# Patient Record
Sex: FEMALE | Race: WHITE | NOT HISPANIC OR LATINO | Employment: FULL TIME | URBAN - NONMETROPOLITAN AREA
[De-identification: names, ages, dates, MRNs, and addresses within clinical notes are randomized per-mention and may not be internally consistent; named-entity substitution may affect disease eponyms.]

---

## 2024-05-03 ENCOUNTER — LAB (OUTPATIENT)
Dept: LAB | Facility: CLINIC | Age: 59
End: 2024-05-03
Payer: COMMERCIAL

## 2024-05-03 ENCOUNTER — OFFICE VISIT (OUTPATIENT)
Dept: HEMATOLOGY/ONCOLOGY | Facility: CLINIC | Age: 59
End: 2024-05-03
Payer: COMMERCIAL

## 2024-05-03 VITALS
HEIGHT: 65 IN | WEIGHT: 129.85 LBS | DIASTOLIC BLOOD PRESSURE: 83 MMHG | OXYGEN SATURATION: 97 % | SYSTOLIC BLOOD PRESSURE: 129 MMHG | TEMPERATURE: 97.5 F | HEART RATE: 80 BPM | RESPIRATION RATE: 16 BRPM | BODY MASS INDEX: 21.63 KG/M2

## 2024-05-03 DIAGNOSIS — D68.51 FACTOR V LEIDEN (MULTI): Primary | ICD-10-CM

## 2024-05-03 DIAGNOSIS — O03.9 MISCARRIAGE (HHS-HCC): ICD-10-CM

## 2024-05-03 DIAGNOSIS — E72.12 MTHFR (METHYLENE THF REDUCTASE) DEFICIENCY AND HOMOCYSTINURIA (MULTI): ICD-10-CM

## 2024-05-03 DIAGNOSIS — E72.11 MTHFR (METHYLENE THF REDUCTASE) DEFICIENCY AND HOMOCYSTINURIA (MULTI): ICD-10-CM

## 2024-05-03 PROBLEM — L03.213 PRESEPTAL CELLULITIS OF RIGHT UPPER EYELID: Status: ACTIVE | Noted: 2020-03-16

## 2024-05-03 PROBLEM — S81.812A LACERATION OF LEFT LOWER EXTREMITY: Status: ACTIVE | Noted: 2021-10-12

## 2024-05-03 PROBLEM — F32.A DEPRESSION: Status: ACTIVE | Noted: 2024-05-03

## 2024-05-03 PROBLEM — L08.9 INFECTED SEBACEOUS CYST: Status: ACTIVE | Noted: 2024-05-03

## 2024-05-03 PROBLEM — M54.50 LOWER BACK PAIN: Status: ACTIVE | Noted: 2024-05-03

## 2024-05-03 PROBLEM — F41.9 ANXIETY AND DEPRESSION: Status: ACTIVE | Noted: 2024-05-03

## 2024-05-03 PROBLEM — S09.90XA CLOSED HEAD INJURY: Status: ACTIVE | Noted: 2021-10-12

## 2024-05-03 PROBLEM — F10.21 RECOVERING ALCOHOLIC IN REMISSION (MULTI): Status: ACTIVE | Noted: 2024-05-03

## 2024-05-03 PROBLEM — F32.A ANXIETY AND DEPRESSION: Status: ACTIVE | Noted: 2024-05-03

## 2024-05-03 PROBLEM — G47.00 INSOMNIA: Status: ACTIVE | Noted: 2024-05-03

## 2024-05-03 PROBLEM — L72.3 INFECTED SEBACEOUS CYST: Status: ACTIVE | Noted: 2024-05-03

## 2024-05-03 PROBLEM — J11.1 INFLUENZA: Status: ACTIVE | Noted: 2024-05-03

## 2024-05-03 PROBLEM — J20.9 ACUTE BRONCHITIS: Status: ACTIVE | Noted: 2024-05-03

## 2024-05-03 PROBLEM — E78.5 HYPERLIPEMIA: Status: ACTIVE | Noted: 2024-05-03

## 2024-05-03 PROCEDURE — G0452 MOLECULAR PATHOLOGY INTERPR: HCPCS | Performed by: INTERNAL MEDICINE

## 2024-05-03 PROCEDURE — 86146 BETA-2 GLYCOPROTEIN ANTIBODY: CPT | Performed by: INTERNAL MEDICINE

## 2024-05-03 PROCEDURE — 81241 F5 GENE: CPT | Performed by: INTERNAL MEDICINE

## 2024-05-03 PROCEDURE — 99205 OFFICE O/P NEW HI 60 MIN: CPT | Performed by: INTERNAL MEDICINE

## 2024-05-03 PROCEDURE — 36415 COLL VENOUS BLD VENIPUNCTURE: CPT | Performed by: INTERNAL MEDICINE

## 2024-05-03 PROCEDURE — 1036F TOBACCO NON-USER: CPT | Performed by: INTERNAL MEDICINE

## 2024-05-03 PROCEDURE — 86147 CARDIOLIPIN ANTIBODY EA IG: CPT | Performed by: INTERNAL MEDICINE

## 2024-05-03 PROCEDURE — 99215 OFFICE O/P EST HI 40 MIN: CPT | Performed by: INTERNAL MEDICINE

## 2024-05-03 RX ORDER — AMITRIPTYLINE HYDROCHLORIDE 25 MG/1
2 TABLET, FILM COATED ORAL NIGHTLY
COMMUNITY
Start: 2023-02-06

## 2024-05-03 RX ORDER — TRIAMCINOLONE ACETONIDE 1 MG/G
CREAM TOPICAL
COMMUNITY
Start: 2024-03-19

## 2024-05-03 RX ORDER — FLUCONAZOLE 150 MG/1
150 TABLET ORAL
COMMUNITY
Start: 2023-06-17

## 2024-05-03 RX ORDER — ACETAMINOPHEN 500 MG
500 TABLET ORAL EVERY 8 HOURS PRN
COMMUNITY

## 2024-05-03 RX ORDER — NYSTATIN 100000 [USP'U]/G
POWDER TOPICAL
COMMUNITY
Start: 2023-06-06

## 2024-05-03 RX ORDER — HYDROCORTISONE 25 MG/G
CREAM TOPICAL
COMMUNITY
Start: 2024-03-19

## 2024-05-03 RX ORDER — PROPRANOLOL HYDROCHLORIDE 20 MG/1
1 TABLET ORAL 2 TIMES DAILY
COMMUNITY
Start: 2023-03-07

## 2024-05-03 ASSESSMENT — PAIN SCALES - GENERAL: PAINLEVEL: 0-NO PAIN

## 2024-05-03 NOTE — PROGRESS NOTES
AVS provided with providers instructions, medication instructions and surgical clearance.  Labs today as ordered.  Surgery in early June with Dr De Jesus.  FUV in Mid July.  Call back instructions reviewed.  Patient verbalized understanding.   Eliquis coupon provided.

## 2024-05-03 NOTE — PATIENT INSTRUCTIONS
ORDERS & PATIENT INSTRUCTIONS:     Circulating lupus anticoagulant, anticardiolipin antibody, antibeta-2 glycoprotein 1 antibody, factor V Leiden mutation.  Homocystine level.        Patient is cleared for her hip surgery  Patient to take Eliquis 2.5 mg p.o. twice daily for 5 weeks after surgery, (patient can start once cleared from surgical standpoint).     Follow-up with me in 4 to 5 weeks after surgery so if she is not back to her baseline, we could potentially extend Eliquis.

## 2024-05-03 NOTE — PROGRESS NOTES
Patient ID: Velma Sierra is a 59 y.o. female.  Referring Physician: No referring provider defined for this encounter.  Primary Care Provider: Robyn Pineda MD      ORDERS & PATIENT INSTRUCTIONS:    Circulating lupus anticoagulant, anticardiolipin antibody, antibeta-2 glycoprotein 1 antibody, factor V Leiden mutation.  Homocystine level.      Patient is cleared for her hip surgery  Patient to take Eliquis 2.5 mg p.o. twice daily for 5 weeks after surgery, (patient can start once cleared from surgical standpoint).    Follow-up with me in 4 to 5 weeks after surgery so if she is not back to her baseline, we could potentially extend Eliquis.        CONSULTING PHYSICIAN:  Dr. Meza    REASON FOR CONSULTATION:    History of miscarriages, factor V Leiden heterozygous mutation MTHFR heterozygous mutation, anticardiolipin antibody positivity in the past    HISTORY OF PRESENT ILLNESS:  Patient is a 59-year-old white woman with past medical history of 3 miscarriages after first healthy baby and further workup at that time revealed that she was heterozygous factor V Leiden mutation positive, anticardiolipin antibody positive and MTHFR mutation positive,.  Patient had subsequent pregnancies during which she received aspirin and Lovenox prophylactically and has had healthy baby's without any additional problem, did not have any DVT or PE.  Who is now planning to go for hip replacement on 7 June 2024 so I been asked to further evaluate for recommending DVT prophylaxis.        PAST MEDICAL HISTORY:  3 miscarriages, factor V Leiden mutation heterozygous, MTHFR mutation heterozygous, anticardiolipin antibody as per HPI, arthritis, hearing problem, eczema, anxiety  No past medical history on file.     Past Surgical History:   Procedure Laterality Date    CERVICAL FUSION  02/26/2014    Cervical Vertebral Fusion            CURRENT MEDICATION:  Current Outpatient Medications   Medication Sig Dispense Refill    acetaminophen  "(Tylenol) 500 mg tablet Take 1 tablet (500 mg) by mouth every 8 hours if needed.      amitriptyline (Elavil) 25 mg tablet Take 2 tablets (50 mg) by mouth once daily at bedtime.      fluconazole (Diflucan) 150 mg tablet Take 1 tablet (150 mg) by mouth 1 (one) time per week.      hydrocortisone 2.5 % cream Apply to active rash on face twice a day when clear stop, then use prn      IBUPROFEN ORAL Take by mouth.      Nyamyc 100,000 unit/gram powder apply to affected area twice a day for 14 days      propranolol (Inderal) 20 mg tablet Take 1 tablet (20 mg) by mouth 2 times a day.      triamcinolone (Kenalog) 0.1 % cream Apply topically twice daily to affected area to active rash Only use Triamcinolone on active rash. Moisturize 1-2 times daily with CeraVe cream. Not to face       No current facility-administered medications for this visit.                 REVIEW OF SYSTEMS:    Arthritic pain but denies any bleeding from any orifice denies any headache fever cough chest pain shortness of breath urinary symptoms rest of the 10 review of system is negative as per HPI    PHYSICAL EXAM:  Objective   BSA: 1.64 meters squared,   /83   Pulse 80   Temp 36.4 °C (97.5 °F) (Temporal)   Resp 16   Ht (S) 1.652 m (5' 5.04\")   Wt 58.9 kg (129 lb 13.6 oz)   SpO2 97%   BMI 21.58 kg/m²   Gen:  Conscious,  no acute distress,   HEENT: Normocephalic, no icterus. . No nasal discharge,  Oral mucosa moist  Chest: Bilateral symmetrical, Bilateral AE        CVS: S1S2.   Abdomen: Soft, no guarding or rigidity BS+   Extremities: No C/C   Skin: No petechiae          LABS:    CBC:No results for input(s): \"WBC\", \"ANC\", \"HGB\", \"HCT\", \"PLT\", \"MCV\" in the last 98276 hours.    No lab exists for component: \"DIF\"    CMP:No results for input(s): \"NA\", \"K\", \"CL\", \"CO2\", \"ANIONGAP\", \"BUN\", \"CREATININE\", \"EGFR\", \"MG\" in the last 91932 hours.No results for input(s): \"ALBUMIN\", \"ALKPHOS\", \"ALT\", \"AST\", \"BILITOT\", \"LIPASE\" in the last 29303 " "hours.    No lab exists for component: \"CA\"    HEME/ENDO:No results for input(s): \"FERRITIN\", \"IRONSAT\", \"TSH\", \"MCIURPFM68\", \"FOLATE\" in the last 57456 hours.     MICRO: No results for input(s): \"ESR\", \"CRP\", \"PROCAL\" in the last 87184 hours.  No results found for the last 90 days.        TUMOR MARKERS:  No results found for: \"LABCA2\", \"CEA\", \"\", \"PSA\", \"AFPTM\", \"HCGTM\", \"\"             IMAGING:         XR LUMBAR SPINE COMPLETE 4+ VIEWS  EXAMINATION: LUMBAR SPINE      HISTORY:   Pain        FINDINGS:   Four views of the lumbar spine including lateral flexion and   extension views. Normal bone mineralization. The vertebral body   heights are maintained. There small anterior and lateral osteophytes   at several levels. No significant spondylolisthesis or change in   alignment with flexion or extension. Mild facet arthropathy mainly at   the lower lumbar spine. The pedicles are preserved.        IMPRESSION:     Mild multilevel degenerative changes. No visible acute process.                SOCIAL HISTORY:    reports no history of alcohol use.   reports no history of drug use.,   Tobacco Use: Low Risk  (5/3/2024)    Patient History     Smoking Tobacco Use: Never     Smokeless Tobacco Use: Never     Passive Exposure: Never       FAMILY HISTORY:  No family history on file.  Node DVT or cancer in the family      ALLERGY:   Shellfish containing products          ASSESSMENT & PLAN:  Patient is a 59-year-old white woman with history of 3 miscarriages and further workup revealed that she was heterozygous for factor V Leiden mutation, heterozygous for MTHFR mutation and positive for anticardiolipin antibody, she had additional successful pregnancy afterward when she was given prophylactic aspirin and Lovenox,.  I had long discussion with patient and  and significance of factor V Leiden mutation were discussed, I also explained that heterozygous factor V Leiden mutation is present in approximately 5% of " Caucasians and carries 3-5 times increased risk of venous thromboembolism compared to general population.  At the same time I do not believe that was the reason of her miscarriages and probably most likely reason for miscarriage could very well be anticardiolipin antibody.  At this time I will recheck anticardiolipin antibody and antibeta-2 glycoprotein 1 antibody circulating lupus anticoagulant to rule out antiphospholipid antibody syndrome as she had MTHFR mutation we will also check homocystine level and just to confirm we will check factor V Leiden mutation testing.  Although as patient never had a venous thromboembolism I will clear her for hip surgery and we will recommend postoperative DVT prophylaxis with Eliquis 2.5 mg p.o. twice daily for approximately 5 weeks and we will reevaluate prior to stopping her anticoagulation just to make sure she is back to her baseline physical activity, if not then we may give additional anticoagulation until she is back to her baseline physical activity.  Discussed with patient and , both agreed with the plan     Discussed with family  Medication reviewed in e-chart  labs reviewed and interpreted independently, X rays independently reviewed  Notes from other physicians involved in care were reviewed      Thank you very much for allowing me to participate in care of this patient, should you have any further question please do not hesitate to contact me.    Charting was completed using voice recognition technology and may include unintended errors.    KAL HOWE MD, ALEX.    Bob Yañez Master Clinician in Hematology and Oncology  Medical Director, [facility] at [facility].  [place] office  Phone [phone]  Fax      [phone]    [facility] /[place].  Phone [phone]  Fax     [phone]

## 2024-05-04 LAB
B2 GLYCOPROT1 IGA SER-ACNC: <0.6 U/ML
B2 GLYCOPROT1 IGG SER-ACNC: <1.4 U/ML
B2 GLYCOPROT1 IGM SER-ACNC: <0.2 U/ML
CARDIOLIPIN IGA SERPL-ACNC: <0.5 APL U/ML
CARDIOLIPIN IGG SER IA-ACNC: <1.6 GPL U/ML
CARDIOLIPIN IGM SER IA-ACNC: <0.2 MPL U/ML

## 2024-05-06 ENCOUNTER — TELEPHONE (OUTPATIENT)
Dept: HEMATOLOGY/ONCOLOGY | Facility: CLINIC | Age: 59
End: 2024-05-06
Payer: COMMERCIAL

## 2024-05-06 NOTE — TELEPHONE ENCOUNTER
"Tenisha from  lab called.  Lupus Anticoagulation was canceled due to \"Improperly preserved/Processed\".  Nurse reviewed chart and noted Homocysteine also canceled with same reason given.  Will notify provider.    "

## 2024-05-09 NOTE — TELEPHONE ENCOUNTER
Call placed to patient to notify of lab issue.  No answer.  Message left on identifiable voicemail notifying patient of lab issue and recommendation to have redrawn.  Call back instructions left.

## 2024-05-13 LAB
ELECTRONICALLY SIGNED BY: ABNORMAL
FACTOR V LEIDEN INTERPRETATION: ABNORMAL
FACTOR V LEIDEN RESULT: ABNORMAL

## 2024-05-17 ENCOUNTER — TELEPHONE (OUTPATIENT)
Dept: HEMATOLOGY/ONCOLOGY | Facility: CLINIC | Age: 59
End: 2024-05-17
Payer: COMMERCIAL

## 2024-05-17 NOTE — TELEPHONE ENCOUNTER
Per Dr. Cerrato,  Please inform that she was in fact heterozygous positive for factor V Leiden and make sure to either check online or we can mail her results     Attempted to contact patient recording states due to network difficulties try your call again later.

## 2024-05-24 ENCOUNTER — TELEPHONE (OUTPATIENT)
Dept: HEMATOLOGY/ONCOLOGY | Facility: CLINIC | Age: 59
End: 2024-05-24
Payer: COMMERCIAL

## 2024-05-24 NOTE — TELEPHONE ENCOUNTER
Per Dr. Cerrato,  Please inform that she was in fact heterozygous positive for factor V Leiden and make sure to either check online or we can mail her results .    Left voicemail message with results and mailed her a copy of labs.

## 2024-07-09 ENCOUNTER — OFFICE VISIT (OUTPATIENT)
Dept: HEMATOLOGY/ONCOLOGY | Facility: CLINIC | Age: 59
End: 2024-07-09
Payer: COMMERCIAL

## 2024-07-09 VITALS
WEIGHT: 134.92 LBS | SYSTOLIC BLOOD PRESSURE: 132 MMHG | OXYGEN SATURATION: 99 % | RESPIRATION RATE: 16 BRPM | TEMPERATURE: 97.5 F | DIASTOLIC BLOOD PRESSURE: 77 MMHG | HEART RATE: 73 BPM | BODY MASS INDEX: 22.43 KG/M2

## 2024-07-09 DIAGNOSIS — D68.51 FACTOR V LEIDEN (MULTI): ICD-10-CM

## 2024-07-09 DIAGNOSIS — E72.11 MTHFR (METHYLENE THF REDUCTASE) DEFICIENCY AND HOMOCYSTINURIA (MULTI): ICD-10-CM

## 2024-07-09 DIAGNOSIS — O03.9 MISCARRIAGE (HHS-HCC): ICD-10-CM

## 2024-07-09 DIAGNOSIS — E72.12 MTHFR (METHYLENE THF REDUCTASE) DEFICIENCY AND HOMOCYSTINURIA (MULTI): ICD-10-CM

## 2024-07-09 PROCEDURE — 99214 OFFICE O/P EST MOD 30 MIN: CPT | Performed by: INTERNAL MEDICINE

## 2024-07-09 ASSESSMENT — PAIN SCALES - GENERAL: PAINLEVEL: 0-NO PAIN

## 2024-07-09 NOTE — PROGRESS NOTES
Lupus anticoag and homocysteine labs were canceled previously due to improper handling of specimen by lab.  Provider and patient notified.  Patient cleared for Hip surgery.  Patient to take Eliquis as recommended after upcoming hip surgery.  Patient provided written after visit summary with this information.  Follow-up as needed.  Call back instructions reviewed.  Patient verbalized understanding.

## 2024-07-09 NOTE — PATIENT INSTRUCTIONS
ORDERS & PATIENT INSTRUCTIONS:           Did we get the results of circulating lupus anticoagulant, Homocystine level.?  Patient can discontinue Eliquis on May 12, 2024     She is planning left hip surgery again in July 31, 2024, patient to resume Eliquis 2.5 mg p.o. twice daily for 5 weeks after surgery and then discontinue.  Patient is cleared for left hip surgery from hematology standpoint as well.        Follow-up if needed

## 2024-07-09 NOTE — PROGRESS NOTES
Patient ID: Velma Sierra is a 59 y.o. female.  Referring Physician: Jemal Cerrato MD  [address]  [place] [address]  Primary Care Provider: Robyn Pineda MD    ORDERS & PATIENT INSTRUCTIONS:      Patient can discontinue Eliquis on May 12, 2024    She is planning left hip surgery again in July 31, 2024, patient to resume Eliquis 2.5 mg p.o. twice daily for 5 weeks after surgery and then discontinue.  Patient is cleared for left hip surgery from hematology standpoint as well.      Follow-up if needed        HPI   Dear Dr Meza    I saw Mrs. Sierra on follow-up as you know patient is a 59-year-old white woman with past medical history of 3 miscarriages after first healthy baby and further workup at that time revealed that she was heterozygous factor V Leiden mutation positive, anticardiolipin antibody positive (repeat test in May 2024 at [facility] was negative) and MTHFR mutation positive,.  Patient had subsequent pregnancies during which she received aspirin and Lovenox prophylactically and has had healthy baby's without any additional problem, did not have any DVT or PE.  Patient underwent right hip replacement on June 7, 2024 and received prophylactic Eliquis 2.5 mg p.o. twice daily and has done extremely well and is almost back to her baseline physical activity.  She is going to go for left hip replacement on 31 July 2024 so presents here today for DVT prophylaxis recommendation       PAST MEDICAL HISTORY:  3 miscarriages, factor V Leiden mutation heterozygous, MTHFR mutation heterozygous,    arthritis, hearing problem, eczema, anxiety    Gen:  Conscious,  no acute distress,   HEENT: Normocephalic, no icterus. . No nasal discharge,  Oral mucosa moist  Chest: Bilateral symmetrical, Bilateral AE        CVS: S1S2.   Abdomen: Soft, no guarding or rigidity BS+   Extremities: No C/C   Skin: No petechiae            Factor V Leiden and heterozygous,  "anticardiolipin antibody antibeta 2 glycoprotein 1 antibodies normal in May 2024      ASSESSMENT & PLAN:  Patient is a 59-year-old white woman with history of 3 miscarriages and further workup revealed that she was heterozygous for factor V Leiden mutation, heterozygous for MTHFR mutation and positive for anticardiolipin antibody, although repeat anticardiolipin antibody done in May 2024 at [facility] has been negative.      I explained to the patient that  heterozygous factor V Leiden mutation is present in approximately 5% of Caucasians and carries 3-5 times increased risk of venous thromboembolism compared to general population.    patient had right hip surgery done on June 7, 2024 and recommended 5 weeks of prophylactic Eliquis 2.5 mg p.o. twice daily.  Patient is almost back to her normal physical activity and patient can stop Eliquis on 12 July 2024  Patient is going for additional hip surgery on left hip on July 31, 2024  Patient can again restart Eliquis 2.5 mg p.o. twice daily after left hip surgery on July 31, 2024 and will take it for total of 5 weeks or until she is back to her normal physical activity.      Again patient was explained that she is 3-5 times higher risk than general population needs to consider either early ambulation during any surgical procedure or prolonged travel or air travel or consider prophylactic Eliquis temporarily.      Prescription for additional Eliquis was written  At this time I will release her from my office and I will be happy to reevaluate in case any new concern arises        VITALS:   1.68 meters squared /77   Pulse 73   Temp 36.4 °C (97.5 °F) (Temporal)   Resp 16   Wt 61.2 kg (134 lb 14.7 oz)   SpO2 99%   BMI 22.43 kg/m²     LABS:    CBC:No results for input(s): \"WBC\", \"ANC\", \"HGB\", \"HCT\", \"PLT\", \"MCV\" in the last 29322 hours.    No lab exists for component: \"DIF\"    CMP:No results for input(s): \"NA\", \"K\", \"CL\", \"CO2\", \"ANIONGAP\", \"BUN\", " "\"CREATININE\", \"EGFR\", \"MG\" in the last 72507 hours.No results for input(s): \"ALBUMIN\", \"ALKPHOS\", \"ALT\", \"AST\", \"BILITOT\", \"LIPASE\" in the last 12104 hours.    No lab exists for component: \"CA\"    HEME/ENDO:No results for input(s): \"FERRITIN\", \"IRONSAT\", \"TSH\", \"[id number]\", \"FOLATE\" in the last 26647 hours.     MICRO: No results for input(s): \"ESR\", \"CRP\", \"PROCAL\" in the last 35658 hours.  No results found for the last 90 days.        TUMOR MARKERS:  No results found for: \"LABCA2\", \"CEA\", \"\", \"PSA\", \"AFPTM\", \"HCGTM\", \"\"             IMAGING:         XR LUMBAR SPINE COMPLETE 4+ VIEWS  EXAMINATION: LUMBAR SPINE      HISTORY:   Pain        FINDINGS:   Four views of the lumbar spine including lateral flexion and   extension views. Normal bone mineralization. The vertebral body   heights are maintained. There small anterior and lateral osteophytes   at several levels. No significant spondylolisthesis or change in   alignment with flexion or extension. Mild facet arthropathy mainly at   the lower lumbar spine. The pedicles are preserved.        IMPRESSION:     Mild multilevel degenerative changes. No visible acute process.        Current Outpatient Medications   Medication Sig Dispense Refill    acetaminophen (Tylenol) 500 mg tablet Take 1 tablet (500 mg) by mouth every 8 hours if needed.      apixaban (Eliquis) 2.5 mg tablet Take 1 tablet (2.5 mg) by mouth 2 times a day. 60 tablet 1    hydrocortisone 2.5 % cream Apply to active rash on face twice a day when clear stop, then use prn      triamcinolone (Kenalog) 0.1 % cream Apply topically twice daily to affected area to active rash Only use Triamcinolone on active rash. Moisturize 1-2 times daily with CeraVe cream. Not to face       No current facility-administered medications for this visit.                  SOCIAL HISTORY:    reports no history of alcohol use.   reports no history of drug use.,   Tobacco Use: Low Risk  (5/3/2024)    Patient History     " Smoking Tobacco Use: Never     Smokeless Tobacco Use: Never     Passive Exposure: Never       FAMILY HISTORY:  No family history on file.    ALLERGY:   Shellfish containing products              Medication reviewed in e-chart  Patient is monitored for medication toxicity  labs reviewed and interpreted independently, X rays independently reviewed  Notes from other physicians involved in care were reviewed    Charting was completed using voice recognition technology and may include unintended errors.    KAL HOWE MD, ALEX.    Bob Yañez Master Clinician in Hematology and Oncology  Medical Director, [facility] at [facility].  [place] office  Phone [phone]  Fax      [phone]  [facility] /[place].  Phone [phone]  Fax     [phone]

## 2024-07-16 ENCOUNTER — APPOINTMENT (OUTPATIENT)
Dept: HEMATOLOGY/ONCOLOGY | Facility: CLINIC | Age: 59
End: 2024-07-16
Payer: COMMERCIAL